# Patient Record
(demographics unavailable — no encounter records)

---

## 2025-04-17 NOTE — ASSESSMENT
[FreeTextEntry1] : 61-year-old female for evaluation status post left pinky finger injury at which occurred at work in February.  She was seen at [facility] urgent care x-rayed segments for no acute fractures.  She has persistent pain and swelling in the PIP joint since time of injury.  Physical examination of the left middle finger: Swelling and tenderness in the PIP joint.  Can fully flex but limited extension.  Good strength throughout.  No deformity.  Sensation intact distally.  Neuro vas intact.  X-rays left pinky finger taken in the office today:  No acute fractures, subluxations, or dislocations.  TX: X-rays are negative.  Patient likely had a sprain at the PIP joint.  I recommended therapy for increased range of motion and edema control.  Red flag symptoms discussed.  She understands swelling may take a very long time to resolve. All questions and concerns addressed to patient's satisfaction. Patient expresses full understanding of treatment plan. I will see her back in 6 weeks to check her progress.